# Patient Record
Sex: MALE | Race: WHITE | NOT HISPANIC OR LATINO | URBAN - METROPOLITAN AREA
[De-identification: names, ages, dates, MRNs, and addresses within clinical notes are randomized per-mention and may not be internally consistent; named-entity substitution may affect disease eponyms.]

---

## 2017-01-11 ENCOUNTER — COMMUNICATION - HEALTHEAST (OUTPATIENT)
Dept: FAMILY MEDICINE | Facility: CLINIC | Age: 37
End: 2017-01-11

## 2017-01-23 ENCOUNTER — OFFICE VISIT - HEALTHEAST (OUTPATIENT)
Dept: FAMILY MEDICINE | Facility: CLINIC | Age: 37
End: 2017-01-23

## 2017-01-23 DIAGNOSIS — L30.9 ECZEMA: ICD-10-CM

## 2017-06-19 ENCOUNTER — COMMUNICATION - HEALTHEAST (OUTPATIENT)
Dept: FAMILY MEDICINE | Facility: CLINIC | Age: 37
End: 2017-06-19

## 2017-06-19 DIAGNOSIS — E55.9 VITAMIN D DEFICIENCY: ICD-10-CM

## 2017-06-19 DIAGNOSIS — J45.909 MILD ASTHMA: ICD-10-CM

## 2017-08-07 ENCOUNTER — COMMUNICATION - HEALTHEAST (OUTPATIENT)
Dept: FAMILY MEDICINE | Facility: CLINIC | Age: 37
End: 2017-08-07

## 2017-08-23 ENCOUNTER — COMMUNICATION - HEALTHEAST (OUTPATIENT)
Dept: FAMILY MEDICINE | Facility: CLINIC | Age: 37
End: 2017-08-23

## 2017-08-23 ENCOUNTER — OFFICE VISIT - HEALTHEAST (OUTPATIENT)
Dept: FAMILY MEDICINE | Facility: CLINIC | Age: 37
End: 2017-08-23

## 2017-08-23 DIAGNOSIS — T14.8XXA ABRASION OF SKIN: ICD-10-CM

## 2017-08-23 ASSESSMENT — MIFFLIN-ST. JEOR: SCORE: 2076.31

## 2017-08-29 ENCOUNTER — COMMUNICATION - HEALTHEAST (OUTPATIENT)
Dept: FAMILY MEDICINE | Facility: CLINIC | Age: 37
End: 2017-08-29

## 2017-08-29 ENCOUNTER — COMMUNICATION - HEALTHEAST (OUTPATIENT)
Dept: SCHEDULING | Facility: CLINIC | Age: 37
End: 2017-08-29

## 2017-08-29 DIAGNOSIS — J45.909 ASTHMA, MILD: ICD-10-CM

## 2017-08-30 ENCOUNTER — COMMUNICATION - HEALTHEAST (OUTPATIENT)
Dept: FAMILY MEDICINE | Facility: CLINIC | Age: 37
End: 2017-08-30

## 2017-08-30 ENCOUNTER — AMBULATORY - HEALTHEAST (OUTPATIENT)
Dept: FAMILY MEDICINE | Facility: CLINIC | Age: 37
End: 2017-08-30

## 2017-08-30 DIAGNOSIS — J45.909 ASTHMA, MILD: ICD-10-CM

## 2017-09-15 ENCOUNTER — OFFICE VISIT - HEALTHEAST (OUTPATIENT)
Dept: FAMILY MEDICINE | Facility: CLINIC | Age: 37
End: 2017-09-15

## 2017-09-15 DIAGNOSIS — F41.1 ANXIETY STATE: ICD-10-CM

## 2017-09-15 DIAGNOSIS — J45.901 ASTHMA EXACERBATION: ICD-10-CM

## 2017-09-15 ASSESSMENT — MIFFLIN-ST. JEOR: SCORE: 2100.12

## 2017-11-19 ENCOUNTER — HEALTH MAINTENANCE LETTER (OUTPATIENT)
Age: 37
End: 2017-11-19

## 2017-12-04 ENCOUNTER — COMMUNICATION - HEALTHEAST (OUTPATIENT)
Dept: FAMILY MEDICINE | Facility: CLINIC | Age: 37
End: 2017-12-04

## 2017-12-04 DIAGNOSIS — J45.30 MILD PERSISTENT ASTHMA WITHOUT COMPLICATION: ICD-10-CM

## 2018-02-23 ENCOUNTER — COMMUNICATION - HEALTHEAST (OUTPATIENT)
Dept: FAMILY MEDICINE | Facility: CLINIC | Age: 38
End: 2018-02-23

## 2018-03-22 ENCOUNTER — OFFICE VISIT - HEALTHEAST (OUTPATIENT)
Dept: FAMILY MEDICINE | Facility: CLINIC | Age: 38
End: 2018-03-22

## 2018-03-22 DIAGNOSIS — H60.92 LEFT OTITIS EXTERNA: ICD-10-CM

## 2018-03-22 DIAGNOSIS — H61.22 HEARING LOSS DUE TO CERUMEN IMPACTION, LEFT: ICD-10-CM

## 2018-06-11 ENCOUNTER — OFFICE VISIT - HEALTHEAST (OUTPATIENT)
Dept: FAMILY MEDICINE | Facility: CLINIC | Age: 38
End: 2018-06-11

## 2018-06-11 DIAGNOSIS — L98.9 SKIN LESION OF LEFT ARM: ICD-10-CM

## 2018-09-18 ENCOUNTER — COMMUNICATION - HEALTHEAST (OUTPATIENT)
Dept: FAMILY MEDICINE | Facility: CLINIC | Age: 38
End: 2018-09-18

## 2018-09-19 ENCOUNTER — COMMUNICATION - HEALTHEAST (OUTPATIENT)
Dept: FAMILY MEDICINE | Facility: CLINIC | Age: 38
End: 2018-09-19

## 2018-09-20 ENCOUNTER — COMMUNICATION - HEALTHEAST (OUTPATIENT)
Dept: SCHEDULING | Facility: CLINIC | Age: 38
End: 2018-09-20

## 2018-09-20 ENCOUNTER — OFFICE VISIT - HEALTHEAST (OUTPATIENT)
Dept: FAMILY MEDICINE | Facility: CLINIC | Age: 38
End: 2018-09-20
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
## 2018-09-20 DIAGNOSIS — J45.30 MILD PERSISTENT ASTHMA WITHOUT COMPLICATION: ICD-10-CM

## 2018-09-20 ASSESSMENT — MIFFLIN-ST. JEOR: SCORE: 2049.57

## 2018-09-24 ENCOUNTER — OFFICE VISIT - HEALTHEAST (OUTPATIENT)
Dept: FAMILY MEDICINE | Facility: CLINIC | Age: 38
End: 2018-09-24

## 2018-09-24 DIAGNOSIS — F41.1 ANXIETY STATE: ICD-10-CM

## 2018-09-24 DIAGNOSIS — E55.9 VITAMIN D DEFICIENCY: ICD-10-CM
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
## 2018-09-24 ASSESSMENT — MIFFLIN-ST. JEOR: SCORE: 2045.04

## 2018-09-25 ENCOUNTER — COMMUNICATION - HEALTHEAST (OUTPATIENT)
Dept: FAMILY MEDICINE | Facility: CLINIC | Age: 38
End: 2018-09-25

## 2018-09-25 LAB — 25(OH)D3 SERPL-MCNC: 33.7 NG/ML (ref 30–80)

## 2018-10-26 ENCOUNTER — COMMUNICATION - HEALTHEAST (OUTPATIENT)
Dept: FAMILY MEDICINE | Facility: CLINIC | Age: 38
End: 2018-10-26

## 2019-01-24 ENCOUNTER — COMMUNICATION - HEALTHEAST (OUTPATIENT)
Dept: FAMILY MEDICINE | Facility: CLINIC | Age: 39
End: 2019-01-24

## 2019-05-09 ENCOUNTER — OFFICE VISIT - HEALTHEAST (OUTPATIENT)
Dept: FAMILY MEDICINE | Facility: CLINIC | Age: 39
End: 2019-05-09

## 2019-05-09 ENCOUNTER — HOSPITAL ENCOUNTER (OUTPATIENT)
Dept: LAB | Age: 39
Setting detail: SPECIMEN
Discharge: HOME | End: 2019-05-09

## 2019-05-09 DIAGNOSIS — Z13.228 ENCOUNTER FOR SCREENING FOR OTHER METABOLIC DISORDERS: ICD-10-CM

## 2019-05-09 DIAGNOSIS — Z00.00 ROUTINE GENERAL MEDICAL EXAMINATION AT A HEALTH CARE FACILITY: ICD-10-CM

## 2019-05-09 DIAGNOSIS — E55.9 VITAMIN D DEFICIENCY: ICD-10-CM

## 2019-05-09 DIAGNOSIS — F41.9 ANXIETY: ICD-10-CM

## 2019-05-09 LAB
CHOLEST SERPL-MCNC: 196 MG/DL
FASTING STATUS PATIENT QL REPORTED: NO
HBA1C MFR BLD: 5.9 % (ref 3.5–6)
HDLC SERPL-MCNC: 61 MG/DL
LDLC SERPL CALC-MCNC: 130 MG/DL

## 2019-05-09 ASSESSMENT — MIFFLIN-ST. JEOR: SCORE: 2033.25

## 2019-05-10 ENCOUNTER — COMMUNICATION - HEALTHEAST (OUTPATIENT)
Dept: FAMILY MEDICINE | Facility: CLINIC | Age: 39
End: 2019-05-10

## 2019-05-10 LAB
25(OH)D3 SERPL-MCNC: 31.3 NG/ML (ref 30–80)
25(OH)D3 SERPL-MCNC: 31.3 NG/ML (ref 30–80)

## 2019-05-12 ENCOUNTER — COMMUNICATION - HEALTHEAST (OUTPATIENT)
Dept: FAMILY MEDICINE | Facility: CLINIC | Age: 39
End: 2019-05-12

## 2019-05-12 DIAGNOSIS — J45.30 MILD PERSISTENT ASTHMA WITHOUT COMPLICATION: ICD-10-CM

## 2019-06-24 ENCOUNTER — COMMUNICATION - HEALTHEAST (OUTPATIENT)
Dept: FAMILY MEDICINE | Facility: CLINIC | Age: 39
End: 2019-06-24

## 2019-06-24 DIAGNOSIS — J45.30 MILD PERSISTENT ASTHMA WITHOUT COMPLICATION: ICD-10-CM

## 2019-08-02 ENCOUNTER — COMMUNICATION - HEALTHEAST (OUTPATIENT)
Dept: FAMILY MEDICINE | Facility: CLINIC | Age: 39
End: 2019-08-02

## 2019-09-18 ENCOUNTER — COMMUNICATION - HEALTHEAST (OUTPATIENT)
Dept: FAMILY MEDICINE | Facility: CLINIC | Age: 39
End: 2019-09-18

## 2019-10-08 ENCOUNTER — COMMUNICATION - HEALTHEAST (OUTPATIENT)
Dept: SCHEDULING | Facility: CLINIC | Age: 39
End: 2019-10-08

## 2019-10-08 ENCOUNTER — OFFICE VISIT - HEALTHEAST (OUTPATIENT)
Dept: FAMILY MEDICINE | Facility: CLINIC | Age: 39
End: 2019-10-08

## 2019-10-08 DIAGNOSIS — J45.31 MILD PERSISTENT ASTHMA WITH ACUTE EXACERBATION: ICD-10-CM

## 2019-10-08 ASSESSMENT — MIFFLIN-ST. JEOR: SCORE: 1992.42

## 2019-10-09 ENCOUNTER — COMMUNICATION - HEALTHEAST (OUTPATIENT)
Dept: FAMILY MEDICINE | Facility: CLINIC | Age: 39
End: 2019-10-09

## 2019-10-09 DIAGNOSIS — J45.31 MILD PERSISTENT ASTHMA WITH ACUTE EXACERBATION: ICD-10-CM

## 2020-01-03 ENCOUNTER — COMMUNICATION - HEALTHEAST (OUTPATIENT)
Dept: FAMILY MEDICINE | Facility: CLINIC | Age: 40
End: 2020-01-03

## 2020-01-13 ENCOUNTER — OFFICE VISIT - HEALTHEAST (OUTPATIENT)
Dept: FAMILY MEDICINE | Facility: CLINIC | Age: 40
End: 2020-01-13

## 2020-01-13 ENCOUNTER — COMMUNICATION - HEALTHEAST (OUTPATIENT)
Dept: FAMILY MEDICINE | Facility: CLINIC | Age: 40
End: 2020-01-13

## 2020-01-13 DIAGNOSIS — J45.901 MODERATE ASTHMA WITH EXACERBATION, UNSPECIFIED WHETHER PERSISTENT: ICD-10-CM

## 2020-01-13 DIAGNOSIS — J45.31 MILD PERSISTENT ASTHMA WITH ACUTE EXACERBATION: ICD-10-CM

## 2020-01-13 DIAGNOSIS — E55.9 VITAMIN D DEFICIENCY: ICD-10-CM

## 2020-01-13 ASSESSMENT — MIFFLIN-ST. JEOR: SCORE: 1995.14

## 2020-01-13 ASSESSMENT — PATIENT HEALTH QUESTIONNAIRE - PHQ9: SUM OF ALL RESPONSES TO PHQ QUESTIONS 1-9: 16

## 2020-01-14 ENCOUNTER — COMMUNICATION - HEALTHEAST (OUTPATIENT)
Dept: FAMILY MEDICINE | Facility: CLINIC | Age: 40
End: 2020-01-14

## 2020-01-14 LAB — 25(OH)D3 SERPL-MCNC: 24.2 NG/ML (ref 30–80)

## 2020-01-20 ENCOUNTER — RECORDS - HEALTHEAST (OUTPATIENT)
Dept: ADMINISTRATIVE | Facility: OTHER | Age: 40
End: 2020-01-20

## 2020-01-21 ENCOUNTER — COMMUNICATION - HEALTHEAST (OUTPATIENT)
Dept: FAMILY MEDICINE | Facility: CLINIC | Age: 40
End: 2020-01-21

## 2020-02-12 ENCOUNTER — COMMUNICATION - HEALTHEAST (OUTPATIENT)
Dept: FAMILY MEDICINE | Facility: CLINIC | Age: 40
End: 2020-02-12

## 2020-04-08 ENCOUNTER — COMMUNICATION - HEALTHEAST (OUTPATIENT)
Dept: FAMILY MEDICINE | Facility: CLINIC | Age: 40
End: 2020-04-08

## 2020-04-08 DIAGNOSIS — J45.901 MODERATE ASTHMA WITH EXACERBATION, UNSPECIFIED WHETHER PERSISTENT: ICD-10-CM

## 2020-06-30 ENCOUNTER — COMMUNICATION - HEALTHEAST (OUTPATIENT)
Dept: FAMILY MEDICINE | Facility: CLINIC | Age: 40
End: 2020-06-30

## 2020-06-30 DIAGNOSIS — J45.901 MODERATE ASTHMA WITH EXACERBATION, UNSPECIFIED WHETHER PERSISTENT: ICD-10-CM

## 2020-11-18 ENCOUNTER — COMMUNICATION - HEALTHEAST (OUTPATIENT)
Dept: FAMILY MEDICINE | Facility: CLINIC | Age: 40
End: 2020-11-18

## 2020-11-18 DIAGNOSIS — J45.31 MILD PERSISTENT ASTHMA WITH ACUTE EXACERBATION: ICD-10-CM

## 2020-11-22 ENCOUNTER — COMMUNICATION - HEALTHEAST (OUTPATIENT)
Dept: FAMILY MEDICINE | Facility: CLINIC | Age: 40
End: 2020-11-22

## 2020-12-02 ENCOUNTER — COMMUNICATION - HEALTHEAST (OUTPATIENT)
Dept: FAMILY MEDICINE | Facility: CLINIC | Age: 40
End: 2020-12-02

## 2020-12-02 ENCOUNTER — OFFICE VISIT - HEALTHEAST (OUTPATIENT)
Dept: FAMILY MEDICINE | Facility: CLINIC | Age: 40
End: 2020-12-02

## 2020-12-02 DIAGNOSIS — F41.1 ANXIETY STATE: ICD-10-CM

## 2020-12-02 DIAGNOSIS — J45.30 MILD PERSISTENT ASTHMA WITHOUT COMPLICATION: ICD-10-CM

## 2020-12-02 DIAGNOSIS — E55.9 VITAMIN D DEFICIENCY: ICD-10-CM

## 2020-12-02 ASSESSMENT — PATIENT HEALTH QUESTIONNAIRE - PHQ9: SUM OF ALL RESPONSES TO PHQ QUESTIONS 1-9: 16

## 2021-02-10 ENCOUNTER — COMMUNICATION - HEALTHEAST (OUTPATIENT)
Dept: FAMILY MEDICINE | Facility: CLINIC | Age: 41
End: 2021-02-10

## 2021-02-10 DIAGNOSIS — J45.31 MILD PERSISTENT ASTHMA WITH ACUTE EXACERBATION: ICD-10-CM

## 2021-02-24 ENCOUNTER — COMMUNICATION - HEALTHEAST (OUTPATIENT)
Dept: FAMILY MEDICINE | Facility: CLINIC | Age: 41
End: 2021-02-24

## 2021-05-26 ENCOUNTER — COMMUNICATION - HEALTHEAST (OUTPATIENT)
Dept: FAMILY MEDICINE | Facility: CLINIC | Age: 41
End: 2021-05-26

## 2021-05-26 DIAGNOSIS — J45.31 MILD PERSISTENT ASTHMA WITH ACUTE EXACERBATION: ICD-10-CM

## 2021-05-27 ASSESSMENT — PATIENT HEALTH QUESTIONNAIRE - PHQ9
SUM OF ALL RESPONSES TO PHQ QUESTIONS 1-9: 16
SUM OF ALL RESPONSES TO PHQ QUESTIONS 1-9: 16

## 2021-05-28 ASSESSMENT — ASTHMA QUESTIONNAIRES
ACT_TOTALSCORE: 8
ACT_TOTALSCORE: 10
ACT_TOTALSCORE: 12

## 2021-05-28 NOTE — PROGRESS NOTES
Assessment and Plan:       Ilir was seen today for annual wellness visit.    Routine general medical examination at a health care facility  Declines vaccines.    Anxiety  -     hydrOXYzine HCl (ATARAX) 25 MG tablet; Take 1 tablet (25 mg total) by mouth 3 (three) times a day as needed (nausea associated with anxiety).  Recommend trial of L- theanine. Continue counseling.    Vitamin D deficiency  -     Vitamin D, Total (25-Hydroxy)    Encounter for screening for other metabolic disorders  -     Glycosylated Hemoglobin A1c  -     HDL Cholesterol  -     LDL Cholesterol, Direct  -     Cholesterol, Total        The patient's current medical problems were reviewed.    I have had an Advance Directives discussion with the patient.  The following health maintenance schedule was reviewed with the patient and provided in printed form in the after visit summary:   Health Maintenance   Topic Date Due     DEPRESSION FOLLOW UP  1981     TDAP ADULT ONE TIME DOSE  09/06/1999     ADVANCE DIRECTIVES DISCUSSED WITH PATIENT  03/31/2016     INFLUENZA VACCINE RULE BASED (Season Ended) 08/01/2019     ASTHMA CONTROL TEST  09/20/2019     ASTHMA FOLLOW-UP  09/20/2019     TD 18+ HE  Discontinued        Subjective:   Chief Complaint: Ilir Hidalgo is an 38 y.o. male here for an Annual Wellness visit.   HPI:  1) Asthma - not well controlled.  2) Vitamin D deficiency - He continues on vitamin D supplementation.  3) Anxiety - Would like refill of hydroxyzine. Continues to be under lot of stress.  He does see Balbina LY on regular basis.    SH: Lives with mother.  Works in IT administrative support.  Using air fryer and Instapot to make healthier meals.  Not exercising.  1 drink a week.  No drugs.  Not sexually active.    Review of Systems:    Please see above.  The rest of the review of systems are negative for all systems.    Patient Care Team:  Lisa Galeano MD as PCP - General     Patient Active Problem  List   Diagnosis     Eczema     Nasal Polyps     Mild asthma     Anxiety Disorder NOS     Vitamin D Deficiency     Allergic Rhinitis     Insomnia     Learning Disability - Language     Obesity     Autistic Disorder     Tooth Eruption Disturbances     Caries     Dermatitis     Fatigue     Past Medical History:   Diagnosis Date     Allergic Rhinitis     Created by Conversion      Anxiety Disorder NOS     Created by Conversion      Asthma With Acute Exacerbation     Created by Conversion      Autistic Disorder     Created by Conversion      Caries     Created by Conversion      Eczema     Created by Conversion      Insomnia     Created by Conversion      Learning Disability - Language     Created by Conversion      Mild Persistent Asthma     Created by Conversion      Nasal Polyps     Created by Conversion      Obesity     Created by Conversion      Tooth Eruption Disturbances     Created by Conversion      Vitamin D Deficiency     Created by Conversion       No past surgical history on file.   No family history on file.   Social History     Socioeconomic History     Marital status: Single     Spouse name: Not on file     Number of children: Not on file     Years of education: Not on file     Highest education level: Not on file   Occupational History     Not on file   Social Needs     Financial resource strain: Not on file     Food insecurity:     Worry: Not on file     Inability: Not on file     Transportation needs:     Medical: Not on file     Non-medical: Not on file   Tobacco Use     Smoking status: Never Smoker     Smokeless tobacco: Never Used   Substance and Sexual Activity     Alcohol use: Yes     Drug use: No     Sexual activity: Not on file   Lifestyle     Physical activity:     Days per week: Not on file     Minutes per session: Not on file     Stress: Not on file   Relationships     Social connections:     Talks on phone: Not on file     Gets together: Not on file     Attends [employer] service: Not on  "file     Active member of club or organization: Not on file     Attends meetings of clubs or organizations: Not on file     Relationship status: Not on file     Intimate partner violence:     Fear of current or ex partner: Not on file     Emotionally abused: Not on file     Physically abused: Not on file     Forced sexual activity: Not on file   Other Topics Concern     Not on file   Social History Narrative     Not on file      Current Outpatient Medications   Medication Sig Dispense Refill     albuterol (VENTOLIN HFA) 90 mcg/actuation inhaler Inhale 2 puffs every 4 hours as needed for wheezing. 18 g 5     ergocalciferol (ERGOCALCIFEROL) 50,000 unit capsule Take 1 capsules by mouth every 2 weeks 6 capsule 0     fluticasone (FLOVENT HFA) 110 mcg/actuation inhaler Inhale 2 puffs 2 (two) times a day. 1 Inhaler 5     hydrOXYzine (ATARAX) 25 MG tablet Take 1 tablet (25 mg total) by mouth 3 (three) times a day as needed (nausea associated with anxiety). 30 tablet 5     inhalational spacing device Spcr Use As Directed. With Inhaler       loratadine (CLARITIN) 10 mg tablet Take 1 tablet (10 mg total) by mouth daily.       montelukast (SINGULAIR) 10 mg tablet Take 1 tablet (10 mg total) by mouth daily. 30 tablet 7     No current facility-administered medications for this visit.       Objective:   Vital Signs:   Visit Vitals  /86 (Patient Site: Right Arm, Patient Position: Sitting, Cuff Size: Adult Large)   Pulse 83   Ht 5' 10.5\" (1.791 m)   Wt (!) 243 lb 6.4 oz (110.4 kg)   BMI 34.43 kg/m         VisionScreening:  No exam data present     PHYSICAL EXAM  /86 (Patient Site: Right Arm, Patient Position: Sitting, Cuff Size: Adult Large)   Pulse 83   Ht 5' 10.5\" (1.791 m)   Wt (!) 243 lb 6.4 oz (110.4 kg)   BMI 34.43 kg/m    No acute distress  HEENT: Head atraumatic / normocephalic.  PERRL.  Conjunctiva clear. Nose with no discharge.  Tympanic membranes grey with normal landmarks.  OP - pink and moist.  Poor " hygiene / dentition.  Neck: Supple.  No lymphadenopathy or thyromegaly.  Lungs: CTA.  No retractions or tachypnea.  CV: RRR. S1 and S2 normal.  No murmurs / rubs / gallops.  No lower extremity edema.    Abdomen: Soft. Non tender. Non distended.  No HSM or masses.  Skin: No rashes or lesions.  Neuro: AAOx3.  Normal strength and tone.  Normal gait.  DTRs in lower extremities equal bilaterally.  Psych: Mood and affect normal.  Good eye contact.  Normal speech.       Wt Readings from Last 3 Encounters:   05/09/19 (!) 243 lb 6.4 oz (110.4 kg)   09/24/18 (!) 246 lb (111.6 kg)   09/20/18 (!) 247 lb (112 kg)         Assessment Results 5/9/2019   Activities of Daily Living No help needed   Instrumental Activities of Daily Living No help needed   Mini Cog Total Score 5   Some recent data might be hidden     A Mini-Cog score of 0-2 suggests the possibility of dementia, score of 3-5 suggests no dementia    Identified Health Risks:     The patient was provided with suggestions to help him develop a healthy emotional lifestyle.   The patient s PHQ-9 score is consistent with moderate depression. He is under care of therapist.  Information regarding advance directives (living [place]), including where he can download the appropriate form, was provided to the patient via the AVS.     Lisa Galeano

## 2021-05-28 NOTE — TELEPHONE ENCOUNTER
RN cannot approve Refill Request    RN can NOT refill this medication med is not covered by policy/route to provider     . Last office visit: 9/24/2018 Lisa Galeano MD Last Physical: 5/9/2019 Last MTM visit: Visit date not found Last visit same specialty: 9/24/2018 Lisa Galeano MD.  Next visit within 3 mo: Visit date not found  Next physical within 3 mo: Visit date not found      Jacklyn Ayala, [facility] Triage/Med Refill 5/13/2019    Requested Prescriptions   Pending Prescriptions Disp Refills     FLOVENT  mcg/actuation inhaler [Pharmacy Med Name: FLOVENT  MCG ORAL INH 120INH]  0     Sig: INHALE 2 PUFFS BY MOUTH TWICE DAILY       There is no refill protocol information for this order

## 2021-05-30 VITALS — WEIGHT: 247 LBS | BODY MASS INDEX: 34.97 KG/M2

## 2021-05-31 VITALS — BODY MASS INDEX: 36.97 KG/M2 | WEIGHT: 258.25 LBS | HEIGHT: 70 IN

## 2021-05-31 VITALS — BODY MASS INDEX: 36.22 KG/M2 | WEIGHT: 253 LBS | HEIGHT: 70 IN

## 2021-06-01 VITALS — BODY MASS INDEX: 35.76 KG/M2 | WEIGHT: 252.6 LBS

## 2021-06-02 VITALS — BODY MASS INDEX: 34.58 KG/M2 | HEIGHT: 71 IN | WEIGHT: 247 LBS

## 2021-06-02 VITALS — HEIGHT: 71 IN | BODY MASS INDEX: 34.44 KG/M2 | WEIGHT: 246 LBS

## 2021-06-02 NOTE — PROGRESS NOTES
Assessment / Impression     1. Mild persistent asthma with acute exacerbation  Nebulizer with supplies (cup, tubing, mask, & filters)    albuterol (ACCUNEB) 1.25 mg/3 mL nebulizer solution    azithromycin (ZITHROMAX Z-LEENA) 250 MG tablet    predniSONE (DELTASONE) 20 MG tablet    fluticasone propionate (FLOVENT HFA) 110 mcg/actuation inhaler    albuterol (VENTOLIN HFA) 90 mcg/actuation inhaler         Plan:     Discussed with patient and mother findings on exam, and patient likely has asthma exacerbation.  Given length of time of symptoms, would recommend treatment with antibiotics to cover atypical bacteria.  Discussed use of Z-Leena.  I also educated patient regarding use of Flovent inhaler, that it needs to be used daily in order to be effective as a maintenance steroid.  Counseled patient regarding use of inhaler.  Also discussed with patient that albuterol inhaler should only be used as a rescue inhaler.   discuss with patient and mother that at this time I would recommend we treat this acute asthma exacerbation with 5-day course of prednisone and to reevaluate his symptoms at this time.  Also discussed that we could hold off on albuterol nebulizer machine with vials, though mother was very adamant that patient should have nebulizer machine because she is worried that he does not know how to use his albuterol inhaler.  Therefore, patient was given a nebulizer machine, DME paperwork was signed, copy will be scanned.  Advised patient to not use albuterol nebulizer machine with his inhaler at the same time.  Also advised patient to follow-up with PCP in 1 week to reassess symptoms, and if they do want to do further evaluation of asthma at that time such as spirometry can do so, but would not recommend doing this evaluation during acute exacerbation.  Mother and patient understand, agree with plan.    I spent total time 40 minutes, counseling time greater than 50% counseling patient and mother regarding treatment  "options and plan, counseling regarding medication use.      Return in about 1 week (around 10/15/2019) for Follow Up for asthma.    Subjective:      HPI: Ilir Hidalgo is a 39 y.o. male, new to me, here today with mother who presents for asthma concerns.  Patient has a history of mild persistent asthma and has Flovent and albuterol inhalers prescribed, though patient states he has not been using his Flovent inhaler consistently because he says he does not know how to, though over the last week with this recent acute illness has been using his albuterol inhaler at least 5-6 times per day.  Patient noted cold symptoms including runny nose and cough that started approximately 1 week ago.  Since then, symptoms have gotten worse, noting wheezing particularly at night and some ear pain.      His mother is very concerned that patient does not know how to use his inhalers, and is also requesting albuterol nebulizer machine to be used in addition to having inhaler.  She also states that he needs \"his asthma worked up\".      Medical History:     Patient Active Problem List   Diagnosis     Eczema     Nasal Polyps     Mild asthma     Anxiety Disorder NOS     Vitamin D Deficiency     Allergic Rhinitis     Insomnia     Learning Disability - Language     Obesity     Autistic Disorder     Tooth Eruption Disturbances     Caries     Dermatitis     Fatigue       Past Medical History:   Diagnosis Date     Allergic Rhinitis     Created by Conversion      Anxiety Disorder NOS     Created by Conversion      Asthma With Acute Exacerbation     Created by Conversion      Autistic Disorder     Created by Conversion      Caries     Created by Conversion      Eczema     Created by Conversion      Insomnia     Created by Conversion      Learning Disability - Language     Created by Conversion      Mild Persistent Asthma     Created by Conversion      Nasal Polyps     Created by Conversion      Obesity     Created by Conversion      Tooth " Eruption Disturbances     Created by Conversion      Vitamin D Deficiency     Created by Conversion        No past surgical history on file.    Current Medications:     Current Outpatient Medications   Medication Sig     albuterol (VENTOLIN HFA) 90 mcg/actuation inhaler Inhale 2 puffs every 4 hours as needed for wheezing.     fluticasone propionate (FLOVENT HFA) 110 mcg/actuation inhaler Inhale 2 puffs 2 (two) times a day.     guaiFENesin ER (MUCINEX) 600 mg 12 hr tablet Take 1,200 mg by mouth 2 (two) times a day.     hydrOXYzine HCl (ATARAX) 25 MG tablet Take 1 tablet (25 mg total) by mouth 3 (three) times a day as needed (nausea associated with anxiety).     inhalational spacing device Spcr Use As Directed. With Inhaler     loratadine (CLARITIN) 10 mg tablet Take 1 tablet (10 mg total) by mouth daily.     multivitamin-Ca-iron-minerals Tab Take 1 tablet by mouth.     albuterol (ACCUNEB) 1.25 mg/3 mL nebulizer solution Take 3 mL (1.25 mg total) by nebulization 3 (three) times a day.     azithromycin (ZITHROMAX Z-LEENA) 250 MG tablet Take 2 tablets (500 mg) on  Day 1,  followed by 1 tablet (250 mg) once daily on Days 2 through 5.     ergocalciferol (ERGOCALCIFEROL) 50,000 unit capsule Take 1 capsules by mouth every 2 weeks     montelukast (SINGULAIR) 10 mg tablet Take 10 mg by mouth.     predniSONE (DELTASONE) 20 MG tablet Take 20 mg by mouth 2 (two) times a day for 5 days.       Family History:   No family history on file.    Review of Systems  All other systems reviewed and are negative.         Social History:     Social History     Tobacco Use   Smoking Status Never Smoker   Smokeless Tobacco Never Used     Social History     Patient does not qualify to have social determinant information on file (likely too young).   Social History Narrative     Not on file         Objective:     /74 (Patient Site: Right Arm, Patient Position: Sitting, Cuff Size: Adult Large)   Pulse 100   Temp 98.1  F (36.7  C) (Oral)    " 5' 10.5\" (1.791 m)   Wt (!) 234 lb 6.4 oz (106.3 kg)   SpO2 95%   BMI 33.16 kg/m    Physical Examination: General appearance - alert, well appearing, and in no distress  Eyes: pupils equal and reactive, extraocular eye movements intact  Ears: normal external ears, clear canals,  Left TM appears normal, with no redness or bulging noted.  Right TM appears normal, with no redness or bulging noted.  Mouth: mucous membranes moist, pharynx normal without lesions  Neck: supple, no significant adenopathy or thyromegaly  Lungs: Normal respiratory effort, though diffuse expiratory wheezing throughout both lung fields.    Heart: normal rate, regular rhythm, normal S1, S2, no murmurs.    No results found for this or any previous visit (from the past 168 hour(s)).      Lina Nichols MD  10/8/2019  5:29 PM        "

## 2021-06-02 NOTE — TELEPHONE ENCOUNTER
"Mother reports \"cough for about ten days.\"  Exacerbates his asthma.  \"Just a normal cough-cold.\"  No fever.  However states \"This causes his asthma to flare up.\"  \"Trouble breathing in his sleep.\"  Upon reviewing meds, mother does not realize pt has inhalers prescribed.  Pt does not appear to be using albuterol inhaler or Flovent.  Explained these are prescribed for control of pt's asthma.  Mother states \"needing a class on asthma inhalers for herself and con.\"    Mother reports needin)  New nebulizer machine  2)  Neb vials  3)  Instructions on how to use inhalers    Agrees to clinical eval today.  Warm transferred to a  for this purpose now.    Annie Mccauley RN BSBA  [facility] RN Triage     Reason for Disposition    Wheezing is present     Cold-induced asthma flare.    Protocols used: COUGH-A-OH      "

## 2021-06-03 VITALS
HEIGHT: 71 IN | BODY MASS INDEX: 32.81 KG/M2 | DIASTOLIC BLOOD PRESSURE: 74 MMHG | SYSTOLIC BLOOD PRESSURE: 112 MMHG | OXYGEN SATURATION: 95 % | TEMPERATURE: 98.1 F | WEIGHT: 234.4 LBS | HEART RATE: 100 BPM

## 2021-06-03 VITALS — WEIGHT: 243.4 LBS | HEIGHT: 71 IN | BODY MASS INDEX: 34.07 KG/M2

## 2021-06-04 VITALS
WEIGHT: 235 LBS | HEART RATE: 99 BPM | OXYGEN SATURATION: 95 % | DIASTOLIC BLOOD PRESSURE: 80 MMHG | SYSTOLIC BLOOD PRESSURE: 102 MMHG | HEIGHT: 71 IN | BODY MASS INDEX: 32.9 KG/M2

## 2021-06-05 NOTE — TELEPHONE ENCOUNTER
PA is not needed. Pharmacy was trying to bill patient's medicare part D.  Neb medications need to go thru Medicare Part B.  Informed  Pharmacy to get patient's Part B information and medication will be covered.

## 2021-06-05 NOTE — PROGRESS NOTES
"SUBJECTIVE: Ilir Hidalgo is a 39 y.o. male with:  Chief Complaint   Patient presents with     Cough     Coughing over 2 months mostly at night     Eczema     on  both hand     Medication Refill     Nebulizer meds   He is here with his mother.  Having more issues with asthma over last few months:     He had bronchitis starting in late September.  He developed URI in November.  He then developed a cough worse at night.  Had been producing some green sputum.  He was seen at urgent care center and diagnosed with bronchitis/ asthma exacerbation.  He took prednisone and antibiotics. He was given nebulizer for albuterol nebs and seemed to help him sleep.  Has been using his albuterol inhaler three times a day.  Using Flovent 110 mcg off / on.   Has not been taking Singulair regularly.    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  He spends 2 days a week in the office.    He is taking vitamin D3 10,000 IU daily.   Anxiety is about the same.  He continues to see Balbina Wahl for counseling.    OBJECTIVE: /80 (Patient Site: Left Arm, Patient Position: Sitting, Cuff Size: Adult Regular)   Pulse 99   Ht 5' 10.5\" (1.791 m)   Wt (!) 235 lb (106.6 kg)   SpO2 95%   BMI 33.24 kg/m     No acute distress.  HEENT: Head is atraumatic and/normocephalic.  PERRL.  Conjunctiva clear.  Tympanic membranes grey with normal landmarks and normal light reflexes.  No nasal discharge.  Oropharynx is pink and moist.  Sinuses nontender.  Neck: Supple.  No lymphadenopathy or thyromegaly.  Lungs: He has some slight end expiratory wheezing but no retractions, or tachypnea.  Heart: RRR. S1 and S2 normal.  No murmurs, rubs, or gallops.  Neuro: Awake, alert, oriented x 3.  Normal strength and tone.  Normal gait.     Ilir was seen today for cough, eczema and medication refill.    Diagnoses and all orders for this visit:    Moderate asthma with exacerbation, unspecified " whether persistent  -     fluticasone propionate (FLOVENT HFA) 220 mcg/actuation inhaler; Inhale 2 puffs 2 (two) times a day.  -     ipratropium-albuterol (DUO-NEB) 0.5-2.5 mg/3 mL nebulizer; Take 3 mL by nebulization every 6 (six) hours as needed.    Vitamin D deficiency  -     Vitamin D, Total (25-Hydroxy)       Patient Instructions   Use Flovent ( fluticasone ) inhaler 2 puffs twice daily ( decreases inflammation)    DuoNeb 1 vial in neb machine every 6 hours as needed     Try Nordic Natural gummies for fish oil. Keep in refrigerator.    Follow-up 1/22/20 at 10 pm for recheck     Lisa Galeano

## 2021-06-05 NOTE — PATIENT INSTRUCTIONS - HE
Use Flovent ( fluticasone ) inhaler 2 puffs twice daily ( decreases inflammation)    DuoNeb 1 vial in neb machine every 6 hours as needed     Try Nordic Natural gummies for fish oil. Keep in refrigerator.    Follow-up 1/22/20 at 10 pm for recheck

## 2021-06-05 NOTE — TELEPHONE ENCOUNTER
RN cannot approve Refill Request    RN can NOT refill this medication med is not covered by policy/route to provider. Last office visit: 10/8/2019 Lina Nichols MD Last Physical: Visit date not found Last MTM visit: Visit date not found Last visit same specialty: 10/8/2019 Lina Nichols MD.  Next visit within 3 mo: Visit date not found  Next physical within 3 mo: Visit date not found      Dulce Anderson, [facility] Triage/Med Refill 1/14/2020    Requested Prescriptions   Pending Prescriptions Disp Refills     fluticasone propionate (FLOVENT HFA) 110 mcg/actuation inhaler [Pharmacy Med Name: FLOVENT  MCG ORAL INH 120INH] 1 Inhaler 1     Sig: INHALE 2 PUFFS BY MOUTH TWICE DAILY       There is no refill protocol information for this order

## 2021-06-05 NOTE — TELEPHONE ENCOUNTER
Prior Authorization Request  Who s requesting:  Pharmacy  Pharmacy Name and Location: [facility]  Medication Name: ipratropium-albuterol (DUO-NEB) 0.5-2.5 mg/3 mL nebulizer  Insurance Plan: [employer]   Insurance Member ID Number:  [id number]  CoverMyMeds Key: [id number]/A  Informed patient that prior authorizations can take up to 10 business days for response:   NA  Okay to leave a detailed message: Yes

## 2021-06-07 NOTE — TELEPHONE ENCOUNTER
RN cannot approve Refill Request    RN can NOT refill this medication med is not covered by policy/route to provider. Last office visit: 1/13/2020 Lisa Galeano MD Last Physical: 5/9/2019 Last MTM visit: Visit date not found Last visit same specialty: 1/13/2020 Lisa Galeano MD.  Next visit within 3 mo: Visit date not found  Next physical within 3 mo: Visit date not found      Catherine Crespo, [facility] Triage/Med Refill 4/8/2020    Requested Prescriptions   Pending Prescriptions Disp Refills     FLOVENT  mcg/actuation inhaler [Pharmacy Med Name: FLOVENT  MCG ORAL INH 120INH] 1 Inhaler 0     Sig: INHALE 2 PUFFS BY MOUTH TWICE DAILY       There is no refill protocol information for this order

## 2021-06-08 NOTE — PROGRESS NOTES
SUBJECTIVE: Ilir Hidalgo is a 36 y.o. male with:  Chief Complaint   Patient presents with     Hand concern     irritation of the skin    He has dry skin on his hands. There are some areas that get excoriated due to scratching.  He gets this every winter.  He has been using different kinds of topical ointments.  He started a humidifier in his home.  He is not using any steroid cream.  He is taking vitamin D3 2000 IU and CereVive daily.  He is not taking fish oil.  He has been eating more yogurt.  He has stopped drinking milk.      OBJECTIVE:   Visit Vitals     /74     Pulse 87     Wt (!) 247 lb (112 kg)     SpO2 97%     BMI 34.97 kg/m2    no distress  Hands: Dry skin with some pink scaly patches and excoriated areas..    Ilir was seen today for hand concern.    Diagnoses and all orders for this visit:    Eczema    Other orders  -     triamcinolone (KENALOG) 0.5 % ointment; Apply to hands at bedtime PRN     Use triamcinolone ointment to hands at bedtime.    Try using coconut oil on hands during the day.    Avoid soaps/ lotions with fragrance.    Consider adding fish oil / probiotics to your vitamin D    Lisa Galeano

## 2021-06-09 NOTE — TELEPHONE ENCOUNTER
RN cannot approve Refill Request    RN can NOT refill this medication med is not covered by policy/route to provider     . Last office visit: 1/13/2020 Lisa Galeano MD Last Physical: 5/9/2019 Last MTM visit: Visit date not found Last visit same specialty: 1/13/2020 Lisa Galeano MD.  Next visit within 3 mo: Visit date not found  Next physical within 3 mo: Visit date not found      Jacklyn Ayala, [facility] Triage/Med Refill 7/1/2020    Requested Prescriptions   Pending Prescriptions Disp Refills     FLOVENT  mcg/actuation inhaler [Pharmacy Med Name: FLOVENT  MCG ORAL INH 120INH] 1 Inhaler 11     Sig: INHALE 2 PUFFS BY MOUTH TWICE DAILY       There is no refill protocol information for this order

## 2021-06-12 ENCOUNTER — HEALTH MAINTENANCE LETTER (OUTPATIENT)
Age: 41
End: 2021-06-12

## 2021-06-12 NOTE — PROGRESS NOTES
"OFFICE VISIT - FAMILY MEDICINE     ASSESSMENT AND PLAN     1. Abrasion of skin in the perineal area    Wound care was provided today, bacitracin was applied, instruction to continue the same at home was given, was also instructed to return if he noticed more bleeding.    CHIEF COMPLAINT   Mass (BC-0 - HEMORRHOID)    HPI   Ilir Hidalgo is a 36 y.o. male.  No Patient Care Coordination Note on file.  Patient had some bleeding in the perineal area this morning, he was able to control that with a wet towel, he is here today for further care, denies any blood in the stool.  No abdominal pain, no similar bleeding or other part of his body.    Review of Systems As per HPI, otherwise negative.    OBJECTIVE   /88 (Patient Site: Right Arm)  Pulse 68  Temp 98.9  F (37.2  C) (Oral)   Resp 13  Ht 5' 10.47\" (1.79 m)  Wt (!) 253 lb (114.8 kg)  SpO2 96%  BMI 35.82 kg/m2  Physical Exam  Perineal exam show skin abrasion in the left side about 1 x 1 cm, with no active bleeding and a small broken pimple on the right side with no active bleeding, he does have one  Large cauliflower warty lesion about 2 cm in the right perianal area with no tenderness.  With no blood or sign of broken skin.    External rectal exam showed soft green stool in the anus, but no bright red blood.  [facility]   No family history on file.  Social History     Social History     Marital status: Single     Spouse name: N/A     Number of children: N/A     Years of education: N/A     Occupational History     Not on file.     Social History Main Topics     Smoking status: Never Smoker     Smokeless tobacco: Never Used     Alcohol use Yes     Drug use: No     Sexual activity: Not on file     Other Topics Concern     Not on file     Social History Narrative       [facility]     Patient Active Problem List    Diagnosis Date Noted     Dermatitis 01/22/2015     Fatigue 01/22/2015     Tooth Eruption Disturbances      Overview Note:     Created by Conversion         " Caries      Overview Note:     Created by Conversion    Replacement Utility updated for latest IMO load       Eczema      Overview Note:     Created by Conversion         Nasal Polyps      Overview Note:     Created by Conversion    Replacement Utility updated for latest IMO load       Mild asthma      Overview Note:     Created by Conversion         Anxiety Disorder NOS      Overview Note:     Created by Conversion    Replacement Utility updated for latest IMO load       Vitamin D Deficiency      Overview Note:     Created by Conversion    Replacement Utility updated for latest IMO load       Allergic Rhinitis      Overview Note:     Created by Conversion    Replacement Utility updated for latest IMO load       Insomnia      Overview Note:     Created by Conversion         Learning Disability - Language      Overview Note:     Created by Conversion         Obesity      Overview Note:     Created by Conversion         Autistic Disorder      Overview Note:     Created by Conversion    Replacement Utility updated for latest IMO load       No past surgical history on file.    RESULTS/CONSULTS (Lab/Rad)   No results found for this or any previous visit (from the past 168 hour(s)).  No results found.  MEDICATIONS     Current Outpatient Prescriptions on File Prior to Visit   Medication Sig Dispense Refill     albuterol (VENTOLIN HFA) 90 mcg/actuation inhaler Inhale 2 puffs every 4 hours as needed for wheezing. 18 g 5     ergocalciferol (ERGOCALCIFEROL) 50,000 unit capsule Take 1 capsules by mouth every 2 weeks 6 capsule 0     fluticasone (FLOVENT HFA) 110 mcg/actuation inhaler Inhale 2 puffs 2 (two) times a day. 1 Inhaler 11     hydrOXYzine (ATARAX) 25 MG tablet Take 1 tablet (25 mg total) by mouth 3 (three) times a day as needed (nausea associated with anxiety). 30 tablet 5     inhalational spacing device Spcr Use As Directed. With Inhaler       loratadine (CLARITIN) 10 mg tablet Take 1 tablet (10 mg total) by mouth  daily.       MAGNESIUM GLUCONATE ORAL Take 1 tablet by mouth daily.       montelukast (SINGULAIR) 10 mg tablet Take 1 tablet (10 mg total) by mouth daily. 30 tablet 7     multivitamin (MULTIVITAMIN) per tablet Take 1 tablet by mouth 2 (two) times a day.       omega-3 fatty acids-vitamin E (FISH OIL) 1,000 mg cap Take 1 capsule by mouth daily. 90 each 3     predniSONE (DELTASONE) 20 MG tablet Take 40 mg by mouth daily.       triamcinolone (KENALOG) 0.5 % ointment Apply to hands at bedtime PRN 30 g 2     UBIDECARENONE (COQ-10 ORAL) Take 1 tablet by mouth daily.       No current facility-administered medications on file prior to visit.        HEALTH MAINTENANCE / SCREENING   No Data Recorded, No Data Recorded,No Data Recorded  Immunization History   Administered Date(s) Administered     DTP 1981, 01/13/1982, 03/11/1982, 03/07/1984, 09/08/1987     Hep B, historic 05/19/1997, 09/11/1997, 12/17/1997     Influenza, inj, historic 12/03/1986, 09/08/1987, 11/08/1989, 11/07/1991, 11/03/1995, 12/17/1997, 10/02/1998     MMR 09/21/1983, 08/29/1994     OPV 1981, 01/13/1982, 03/07/1984, 09/08/1987     Td, historic 04/22/1994     Health Maintenance   Topic     ASTHMA CONTROL TEST      ASTHMA FOLLOW-UP      TDAP ADULT ONE TIME DOSE      TD 18+ HE      ADVANCE DIRECTIVES DISCUSSED WITH PATIENT      INFLUENZA VACCINE RULE BASED (1)       Paola Elam MD  Family Medicine, [facility]   This transcription uses voice recognition software, which may contain typographical errors.

## 2021-06-13 NOTE — PROGRESS NOTES
"Ilir Hidalgo is a 40 y.o. male who is being evaluated via a billable telephone visit.      The patient has been notified of following:     \"This telephone visit will be conducted via a call between you and your physician/provider. We have found that certain health care needs can be provided without the need for a physical exam.  This service lets us provide the care you need with a short phone conversation.  If a prescription is necessary we can send it directly to your pharmacy.  If lab work is needed we can place an order for that and you can then stop by our lab to have the test done at a later time.    Telephone visits are billed at different rates depending on your insurance coverage. During this emergency period, for some insurers they may be billed the same as an in-person visit.  Please reach out to your insurance provider with any questions.    If during the course of the call the physician/provider feels a telephone visit is not appropriate, you will not be charged for this service.\"    Patient has given verbal consent to a Telephone visit? Yes    What phone number would you like to be contacted at? [phone]     Patient would like to receive their AVS by AVS Preference: Leonard.    Additional provider notes: me     SUBJECTIVE: Ilir Hidalgo is a 40 y.o. male with:  Chief Complaint   Patient presents with     Follow-up    1) Vitamin D - Has had low vitamin D in the past.  He is currently not taking any vitamin D supplementation.    2) Asthma - He uses Flovent on a PRN basis, not daily.  Uses albuterol on a PRN basis.  He also has a neb machine at home. He does not feel his asthma is flared up at this time.    3) Anxiety - He has had more stress with the pandemic.  Frustrated as he cannot get his disability services.  He does counseling on a regular basis.  He does go for walks on a regular basis.  He is living with meals.  Eating more meals at home.  He is on unemployment.    4) Autism - He " is developmentally disabled.  Mother states I may need to fill out a letter for sliding scale insulin in future for him.    Assessment/Plan:  1. Vitamin D deficiency  Recheck level  - Vitamin D, Total (25-Hydroxy); Future    2. Anxiety Disorder NOS  Continue counseling.  He has declined medicine in the past.    3. Mild persistent asthma without complication  He has been stable.  Use inhalers as needed.        Phone call duration: 12  minutes    KATHY Balderas

## 2021-06-13 NOTE — PROGRESS NOTES
"SUBJECTIVE: Ilir Hidalgo is a 37 y.o. male with:  Chief Complaint   Patient presents with     Asthma     flare ups- might had pickup up \"[place] infection\" at [facility] - all the way labor day - green mucus     Thyroid Problem     thyroid glands    1) Asthma- He has had a flare-up in the last few days due to poor air quality.  Last night he used his albuterol every 1 hour.  He was coughing last night.  He feels shortness of breath today.  He is using Flovent inhaler 2 puffs more frequently.  He was at a hotel in [place] end of August and worried about the AC unit not providing clean air.  He is taking the Singulair.  No fever/ chills or recent illness.  He has had some sinus congestion.    2) Pt's mother is worried about his thyroid gland.  He has had normal thyroid function in the past.    3) Anxiety - He is continuing to see Balbina Wahl for therapy.  He has a new job and got upset at work and yelled so had some consequences.  He feels that his disability is not being addressed at work.  [place] also [place]    OBJECTIVE: /80 (Patient Site: Left Arm, Patient Position: Sitting, Cuff Size: Adult Large)  Pulse 64  Temp 98  F (36.7  C) (Oral)   Resp 12  Ht 5' 10.47\" (1.79 m)  Wt (!) 258 lb 4 oz (117.1 kg)  SpO2 95%  BMI 36.56 kg/m2   No acute distress.  HEENT: Head is atraumatic and/normocephalic.  PERRL.  Conjunctiva clear.  Tympanic membranes grey with normal landmarks and normal light reflexes.  No nasal discharge.  Oropharynx is pink and moist.  Sinuses nontender.  Neck: Supple.  No lymphadenopathy or thyromegaly.  Lungs:  Slight expiratory wheezing.  No retractions, or tachypnea.  Heart: RRR. S1 and S2 normal.  No murmurs, rubs, or gallops.  Neuro: Awake, alert, oriented x 3.  Normal strength and tone.  Normal gait.   PSYCH:  Awake, alert, and oriented x 3.  Mood and affect are anxious.  Good eye contact.  Speech is normal.  No suicidal ideation.  No " hallucinations.    Ilir was seen today for asthma and thyroid problem.    Diagnoses and all orders for this visit:    Asthma exacerbation- Will start him on course of steroids.    Anxiety Disorder NOS- Continue counseling.  Consider medication.    Other orders  -     predniSONE (DELTASONE) 20 MG tablet; Take 2 tablets (40 mg total) by mouth daily.       Patient Instructions   Take prednisone 20 mg 2 tabs daily for 5 days    Resume Flovent 2 puffs twice daily    Make a follow-up visit if not improved by Monday ( call [facility] )    Consider Gene Sight testing for medication interactions with genes    Lisa Galeano

## 2021-06-15 NOTE — TELEPHONE ENCOUNTER
Spoke with pt and mom today. Informed them that pt is due for his physical and he will plan to est. care and do physical with Dr. Padilla at [facility]. Will send [facility] phone number and address to pt via my chart per his request and they will schedule the appointment when they are ready. xl

## 2021-06-15 NOTE — TELEPHONE ENCOUNTER
Refill Approved    Rx renewed per Medication Renewal Policy. Medication was last renewed on 11/22/20.    Anthony Santos, [facility] Triage/Med Refill 2/10/2021     Requested Prescriptions   Pending Prescriptions Disp Refills     albuterol (VENTOLIN HFA) 90 mcg/actuation inhaler [Pharmacy Med Name: VENTOLIN HFA INH W/DOS CTR 200PUFFS] 54 g 0     Sig: INHALE 2 PUFFS BY MOUTH EVERY 4 HOURS AS NEEDED FOR WHEEZING       Albuterol/Levalbuterol Refill Protocol Passed - 2/10/2021  8:12 AM        Passed - PCP or prescribing provider visit in last year     Last office visit with prescriber/PCP: 1/13/2020 Lsia Galeano MD OR same dept: Visit date not found OR same specialty: 1/13/2020 Lisa Galeano MD Last physical: 5/9/2019       Next appt within 3 mo: Visit date not found  Next physical within 3 mo: Visit date not found  Prescriber OR PCP: Lisa Galeano MD  Last diagnosis associated with med order: 1. Mild persistent asthma with acute exacerbation  - VENTOLIN HFA 90 mcg/actuation inhaler [Pharmacy Med Name: VENTOLIN HFA INH W/DOS CTR 200PUFFS]; INHALE 2 PUFFS BY MOUTH EVERY 4 HOURS AS NEEDED FOR WHEEZING  Dispense: 54 g; Refill: 0    If protocol passes may refill for 6 months if within 3 months of last provider visit (or a total of 9 months). If patient requesting >1 inhaler per month refill x 6 months and have patient make appointment with provider.

## 2021-06-16 PROBLEM — J45.30 MILD PERSISTENT ASTHMA WITHOUT COMPLICATION: Status: ACTIVE | Noted: 2020-12-02

## 2021-06-16 NOTE — PROGRESS NOTES
Subjective:      Patient ID: Ilir Hidalgo is a 37 y.o. male.    Chief Complaint:    HPI Ilir Hidalgo is a 37 y.o. male with past medical history of autism, asthma, allergies, and cerumen impactions who presents today complaining of left ear fullness x 2 days.  Patient states that he is also had green nasal mucus secretions and congestion.  He denies any coughing.  He has had some green diarrhea that he has attributed to swallowing mucus.  He denies any nausea or vomiting.  He has not had any known fevers but had a few chills approximately 3 days ago.  He states that the left ear fullness is making it difficult for him to hear.        Past Medical History:   Diagnosis Date     Allergic Rhinitis     Created by Conversion      Anxiety Disorder NOS     Created by Conversion      Asthma With Acute Exacerbation     Created by Conversion      Autistic Disorder     Created by Conversion      Caries     Created by Conversion      Eczema     Created by Conversion      Insomnia     Created by Conversion      Learning Disability - Language     Created by Conversion      Mild Persistent Asthma     Created by Conversion      Nasal Polyps     Created by Conversion      Obesity     Created by Conversion      Tooth Eruption Disturbances     Created by Conversion      Vitamin D Deficiency     Created by Conversion          Social History   Substance Use Topics     Smoking status: Never Smoker     Smokeless tobacco: Never Used     Alcohol use Yes       Review of Systems   Constitutional: Positive for chills.   HENT: Positive for congestion, ear pain (bilateral ear fullness) and rhinorrhea.    Respiratory: Negative for cough.    Gastrointestinal: Positive for diarrhea. Negative for nausea and vomiting.   Allergic/Immunologic: Positive for environmental allergies.       Objective:     /70 (Patient Site: Right Arm, Patient Position: Sitting, Cuff Size: Adult Large)  Pulse 80  Temp 98.2  F (36.8  C) (Oral)   Resp 18   Wt (!) 252 lb 9.6 oz (114.6 kg)  SpO2 95%  BMI 35.76 kg/m2    Physical Exam   Constitutional: He appears well-developed and well-nourished. No distress.   HENT:   Head: Normocephalic and atraumatic.   Right Ear: Tympanic membrane, external ear and ear canal normal.   Left Ear: Tympanic membrane and external ear normal. No drainage.   Patient has 100% blockage of cerumen in the left canal.  Cerumen is very dark.  After irrigation performed by to completed the medical assistant the ear canal was patent and there was some inflammation behind when the ear cerumen impaction was.  The tympanic membrane appears normal and has no bulging, erythema, or effusion.   Eyes: Conjunctivae are normal.   Cardiovascular: Normal rate, regular rhythm and normal heart sounds.  Exam reveals no gallop and no friction rub.    No murmur heard.  Pulmonary/Chest: Effort normal and breath sounds normal. No respiratory distress. He has no wheezes. He has no rales.   Skin: He is not diaphoretic.   Psychiatric: He has a normal mood and affect. His behavior is normal. Judgment and thought content normal.   Nursing note and vitals reviewed.      Assessment:     Procedures    1. Hearing loss due to cerumen impaction, left  Nursing communication   2. Left otitis externa  ofloxacin (FLOXIN) 0.3 % otic solution         Patient Instructions   1. Apply ofloxacin according to bottle instructions.  2. After you have completed the treatment he can use debrox solution and bulb suction to try to remove your ear wax.   3. Follow up if you are not having any improvement in your sinus symptoms or ear symptoms over the course of the next 7 days.
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
## 2021-06-21 NOTE — LETTER
Letter by Lisa Galeano MD at      Author: Lisa Galeano MD Service: -- Author Type: --    Filed:  Encounter Date: 12/2/2020 Status: (Other)                    My Depression Action Plan  Name: Ilir Hidalgo   Date of Birth 1980  Date: 12/2/2020    My Doctor: Lisa Galeano MD   My Clinic: [address] [address]  [phone]          GREEN    ZONE   Good Control    What it looks like:     Things are going generally well. You have normal ups and downs. You may even feel depressed from time to time, but bad moods usually last less than a day.   What you need to do:  1. Continue to care for yourself (see self care plan)  2. Check your depression survival kit and update it as needed  3. Follow your physicians recommendations including any medication.  4. Do not stop taking medication unless you consult with your physician first.           YELLOW         ZONE Getting Worse    What it looks like:     Depression is starting to interfere with your life.     It may be hard to get out of bed; you may be starting to isolate yourself from others.    Symptoms of depression are starting to last most all day and this has happened for several days.     You may have suicidal thoughts but they are not constant.   What you need to do:     1. Call your care team. Your response to treatment will improve if you keep your care team informed of your progress. Yellow periods are signs an adjustment may need to be made.     2. Continue your self-care.  Just get dressed and ready for the day.  Don't give yourself time to talk yourself out of it.    3. Talk to someone in your support network.    4. Open up your depression Depression Self-Care Plan / Wellness kit.           RED    ZONE Medical Alert - Get Help    What it looks like:     Depression is seriously interfering with your life.     You may experience these or other symptoms:  You cant get out of bed most days, cant work or engage in other necessary activities, you have trouble taking care of basic hygiene, or basic responsibilities, thoughts of suicide or death that will not go away, self-injurious behavior.     What you need to do:  1. Call your care team and request a same-day appointment. If they are not available (weekends or after hours) call your local crisis line, emergency room or 911.            Self-Care Plan / Wellness Kit    Self-Care for Depression  Heres the deal. Your body and mind are really not as separate as most people think.  What you do and think affects how you feel and how you feel influences what you do and think. This means if you do things that people who feel good do, it will help you feel better.  Sometimes this is all it takes.  There is also a place for medication and therapy depending on how severe your depression is, so be sure to consult with your medical provider and/ or Behavioral Health Consultant if your symptoms are worsening or not improving.     In order to better manage my stress, I will:    Exercise  Get some form of exercise, every day. This will help reduce pain and release endorphins, the feel good chemicals in your brain. This is almost as good as taking antidepressants!  This is not the same as joining a gym and then never going! (they count on that by the way?) It can be as simple as just going for a walk or doing some gardening, anything that will get you moving.      Hygiene   Maintain good hygiene (get out of bed in the morning, make your bed, brush your teeth, take a shower, and get dressed like you were going to work, even if you are unemployed).  If your clothes don't fit try to get ones that do.    Diet  Strive to eat foods that are good for me, drink plenty of water, and avoid excessive sugar, caffeine, alcohol, and other mood-altering substances.  Some foods that are helpful in depression are: complex carbohydrates, B vitamins,  flaxseed, fish or fish oil, fresh fruits and vegetables.    Psychotherapy  Agree to participate in Individual Therapy (if recommended).    Medication  If prescribed medications, I agree to take them.  Missing doses can result in serious side effects.  I understand that drinking alcohol, or other illicit drug use, may cause potential side effects.  I will not stop my medication abruptly without first discussing it with my provider.    Staying Connected With Others  Stay in touch with my friends, family members, and my primary care provider/team.    Use your imagination  Be creative.  We all have a creative side; it doesnt matter if its oil painting, sand castles, or mud pies! This will also kick up the endorphins.    Witness Beauty  (AKA stop and smell the roses) Take a look outside, even in mid-winter. Notice colors, textures. Watch the squirrels and birds.     Service to others  Be of service to others.  There is always someone else in need.  By helping others we can get out of ourselves and remember the really important things.  This also provides opportunities for practicing all the other parts of the program.    Humor  Laugh and be silly!  Adjust your TV habits for less news and crime-drama and more comedy.    Control your stress  Try breathing deep, massage therapy, biofeedback, and meditation. Find time to relax each day.     Crisis Text Line  http://www.crisistextline.org    The Crisis Text Line serves anyone, in any type of crisis, providing access to free, 24/7 support and information via the medium people already use and trust:    Here's how it works:  1.  Text 092-403 from anywhere in the [place], anytime, about any type of crisis.  2.  A live, trained Crisis Counselor receives the text and responds quickly.  3.  The volunteer Crisis Counselor will help you move from a 'hot moment to a cool moment'.  My support system    Clinic Contact:  Phone number:    Contact 1:  Phone number:    Contact 2:  Phone number:     Rastafari/:  Phone number:    Therapist:  Phone number:    [facility] crisis center:    Phone number:    Other community support:  Phone number:

## 2021-06-21 NOTE — LETTER
Letter by Lisa Galeano MD at      Author: Lisa Galeano MD Service: -- Author Type: --    Filed:  Encounter Date: 12/2/2020 Status: (Other)       My Asthma Action Plan    Name: Ilir Hidalgo   YOB: 1980  Date: 12/2/2020   My doctor: Lisa Galeano MD   My clinic: [facility]        My Control Medicine: Fluticasone propionate (Flovent HFA) - 220 mcg 2 puffs bid as needed  My Rescue Medicine: Albuterol (Proair/Ventolin/Proventil HFA) 2-4 puffs EVERY 4 HOURS as needed. Use a spacer if recommended by your provider.   My Oral Steroid Medicine: not needed My Asthma Severity:   Mild Persistent  Know your asthma triggers: smoke, upper respiratory infections, dust mites, pollens, mold, humidity, exercise or sports, emotions, cold air and Gastric Reflux               GREEN ZONE   Good Control    I feel good    No cough or wheeze    Can work, sleep and play without asthma symptoms     Take your asthma control medicine every day.     1. If exercise triggers your asthma, take your rescue medication    15 minutes before exercise or sports, and    During exercise if you have asthma symptoms  2. Spacer to use with inhaler: If you have a spacer, make sure to use it with your inhaler             YELLOW ZONE Getting Worse  I have ANY of these:    I do not feel good    Cough or wheeze    Chest feels tight    Wake up at night 1. Keep taking your Green Zone medications  2. Start taking your rescue medicine:    every 20 minutes for up to 1 hour. Then every 4 hours for 24-48 hours.  3. If you stay in the Yellow Zone for more than 12-24 hours, contact your doctor.  4. If you do not return to the Green Zone in 12-24 hours or you get worse, start taking your oral steroid medicine if prescribed by your provider.           RED ZONE Medical Alert - Get Help  I have ANY of these:    I feel awful    Medicine is not helping    Breathing getting  harder    Trouble walking or talking    Nose opens wide to breathe     1. Take your rescue medicine NOW  2. If your provider has prescribed an oral steroid medicine, start taking it NOW  3. Call your doctor NOW  4. If you are still in the Red Zone after 20 minutes and you have not reached your doctor:    Take your rescue medicine again and    Call 911 or go to the emergency room right away    See your regular doctor within 2 weeks of an Emergency Room or Urgent Care visit for follow-up treatment.          Annual Reminders:  Meet with Asthma Educator,  Flu Shot in the Fall, consider Pneumonia Vaccination for patients with asthma (aged 19 and older).    Pharmacy:   [facility] - [place], [place] - [address]  [address]  [place] [address]  Phone: [phone] Fax: [phone]    [id number] - [place], [place] - [address] AT [facility] [address]  [address]  [place] [address]  Phone: [phone] Fax: [phone]      Electronically signed by Lisa Galeano MD   Date: 12/02/20                    Asthma Triggers  How To Control Things That Make Your Asthma Worse    Triggers are things that make your asthma worse.  Look at the list below to help you find your triggers and what you can do about them.  You can help prevent asthma flare-ups by staying away from your triggers.      Trigger                                                          What you can do   Cigarette Smoke  Tobacco smoke can make asthma worse. Do not allow smoking in your home, car or around you.  Be sure no one smokes at a james day care or school.  If you smoke, ask your health care provider for ways to help you quit.  Ask family members to quit too.  Ask your health care provider for a referral to Quit Plan to help you quit smoking, or call 9-705-904-PLAN.     Colds, Flu, Bronchitis  These are common triggers of asthma. Wash your hands often.  Dont touch  your eyes, nose or mouth.  Get a flu shot every year.     Dust Mites  These are tiny bugs that live in cloth or carpet. They are too small to see. Wash sheets and blankets in hot water every week.   Encase pillows and mattress in dust mite proof covers.  Avoid having carpet if you can. If you have carpet, vacuum weekly.   Use a dust mask and HEPA vacuum.   Pollen and Outdoor Mold  Some people are allergic to trees, grass, or weed pollen, or molds. Try to keep your windows closed.  Limit time out doors when pollen count is high.   Ask you health care provider about taking medicine during allergy season.     Animal Dander  Some people are allergic to skin flakes, urine or saliva from pets with fur or feathers. Keep pets with fur or feathers out of your home.    If you cant keep the pet outdoors, then keep the pet out of your bedroom.  Keep the bedroom door closed.  Keep pets off cloth furniture and away from stuffed toys.     Mice, Rats, and Cockroaches   Some people are allergic to the waste from these pests.   Cover food and garbage.  Clean up spills and food crumbs.  Store grease in the refrigerator.   Keep food out of the bedroom.   Indoor Mold  This can be a trigger if your home has high moisture. Fix leaking faucets, pipes, or other sources of water.   Clean moldy surfaces.  Dehumidify basement if it is damp and smelly.   Smoke, Strong Odors, and Sprays  These can reduce air quality. Stay away from strong odors and sprays, such as perfume, powder, hair spray, paints, smoke incense, paint, cleaning products, candles and new carpet.   Exercise or Sports  Some people with asthma have this trigger. Be active!  Ask your doctor about taking medicine before sports or exercise to prevent symptoms.    Warm up for 5-10 minutes before and after sports or exercise.     Other Triggers of Asthma  Cold air:  Cover your nose and mouth with a scarf.  Sometimes laughing or crying can be a trigger.  Some medicines and food can  trigger asthma.

## 2021-06-26 NOTE — PROGRESS NOTES
Progress Notes by Ilir Mcpherson PA-C at 6/11/2018  1:00 PM     Author: Ilir Mcpherson PA-C Service: -- Author Type: Physician Assistant    Filed: 6/11/2018  1:38 PM Encounter Date: 6/11/2018 Status: Signed    : Ilir Mcpherson PA-C (Physician Assistant)          Assessment and Plan     Ilir was seen today for insect bite.    Diagnoses and all orders for this visit:    Skin lesion of left arm       HPI     Chief Complaint   Patient presents with   ? Insect Bite     left upper arm, x2-4 days     Ilir Hidalgo is a 37 y.o. male seen today for a possible bite on his left upper arm that he first noticed 2-3 days ago.  He is not able to recall a specific bug bite.  Denies pulling any ticks off his skin.  Denies spending time in any tick endemic areas.  His only exposure was mowing the lawn once a few days ago.  Spends most of his time indoors.  He denies any pain or tenderness in the area of the discoloration.  Denies fevers or chills.  Denies nausea, vomiting, diarrhea.  Denies myalgias or arthralgias.       Current Outpatient Prescriptions:   ?  albuterol (VENTOLIN HFA) 90 mcg/actuation inhaler, Inhale 2 puffs every 4 hours as needed for wheezing., Disp: 18 g, Rfl: 5  ?  ergocalciferol (ERGOCALCIFEROL) 50,000 unit capsule, Take 1 capsules by mouth every 2 weeks, Disp: 6 capsule, Rfl: 0  ?  fluticasone (FLOVENT HFA) 110 mcg/actuation inhaler, Inhale 2 puffs 2 (two) times a day., Disp: 1 Inhaler, Rfl: 5  ?  hydrOXYzine (ATARAX) 25 MG tablet, Take 1 tablet (25 mg total) by mouth 3 (three) times a day as needed (nausea associated with anxiety)., Disp: 30 tablet, Rfl: 5  ?  inhalational spacing device Spcr, Use As Directed. With Inhaler, Disp: , Rfl:   ?  loratadine (CLARITIN) 10 mg tablet, Take 1 tablet (10 mg total) by mouth daily., Disp: , Rfl:   ?  MAGNESIUM GLUCONATE ORAL, Take 1 tablet by mouth daily., Disp: , Rfl:   ?  montelukast (SINGULAIR) 10 mg tablet, Take 1 tablet (10 mg total) by  mouth daily., Disp: 30 tablet, Rfl: 7  ?  multivitamin (MULTIVITAMIN) per tablet, Take 1 tablet by mouth 2 (two) times a day., Disp: , Rfl:   ?  omega-3 fatty acids-vitamin E (FISH OIL) 1,000 mg cap, Take 1 capsule by mouth daily., Disp: 90 each, Rfl: 3  ?  predniSONE (DELTASONE) 20 MG tablet, Take 2 tablets (40 mg total) by mouth daily., Disp: 10 tablet, Rfl: 0  ?  triamcinolone (KENALOG) 0.5 % ointment, Apply to hands at bedtime PRN, Disp: 30 g, Rfl: 2  ?  UBIDECARENONE (COQ-10 ORAL), Take 1 tablet by mouth daily., Disp: , Rfl:      Reviewed and updated: medical history, medications and allergies.     Review of Systems     General: Denies fever, chills, fatigue.  Cardiovascular: Denies chest pain, dyspnea on exertion, palpitations.  Respiratory: Denies dyspnea, cough, wheezing.  GI: Denies nausea, vomiting, diarrhea, constipation.  : Denies dysuria, polyuria.     Objective     Vitals:    06/11/18 1307   BP: 112/88   Patient Site: Right Arm   Patient Position: Sitting   Cuff Size: Adult Regular   Pulse: 89   Resp: 18   Temp: 98.4  F (36.9  C)   TempSrc: Oral   SpO2: 96%   Weight: (!) 252 lb 9.6 oz (114.6 kg)        Reviewed vital signs.  General: Appears calm, comfortable. Answers questions quickly and appropriately with clear speech. No apparent distress.  Skin: Pink, warm, dry.  There is an area of discoloration on the inner surface of the left upper arm approximately 0.5 cm x 3 cm.  It is flat and nontender.  Nonblanching.  HENT: Normocephalic, atraumatic.  Neck: Supple.  Cardiovascular: Strong, regular radial pulse.  Respiratory: Normal respiratory effort.  Neuro: Memory and cognition appear normal. Normal gait.  Psych: Mood and affect appear normal.          Imaging:   No results found.    Labs:  No results found for this or any previous visit (from the past 24 hour(s)).     Medical Decision-Making     Ilir is a well-appearing 37-year-old male presents with 2-3 days of a discolored area on the inside of  his left upper arm.  His appearance is consistent with a bruise.  There were no punctate lesions concerning for an insect bite.  It is not erythematous.  His expressed concern was a tick bite with resulting erythema migrans.  This lesion does not have the characteristic color, shape, or necessary tick exposure for erythema migrans.  He has no history, symptoms, or exam findings concerning for tickborne illness.  I do believe this is an isolated bruise and does not believe any other workup or investigation is indicated at this time.    Reviewed red flags that would trigger a prompt return to the clinic as noted below under patient instructions.  He expressed understanding of these directions and is in agreement with the plan.     Patient Instructions     Patient Instructions   You have a discolored area on your left upper arm.  Its appearance is not concerning for any sort of tick bite, insect bite, or rash.    Please return to the clinic if you notice any of the following:    Fever / chills.    Growing area of discoloration.    Pain in the discolored area.    Muscle aches or joint aches.        Discussed benefit vs risk of medications, dosing, side effects.  Patient was able to verbalize understanding.  After visit summary was provided for patient.     Gee Mcpherson PA-C

## 2021-07-01 ENCOUNTER — COMMUNICATION - HEALTHEAST (OUTPATIENT)
Dept: FAMILY MEDICINE | Facility: CLINIC | Age: 41
End: 2021-07-01

## 2021-07-01 DIAGNOSIS — J45.31 MILD PERSISTENT ASTHMA WITH ACUTE EXACERBATION: ICD-10-CM

## 2021-07-04 NOTE — TELEPHONE ENCOUNTER
Telephone Encounter by Martha Dial RN at 7/1/2021  7:04 PM     Author: Martha Dial RN Service: -- Author Type: Registered Nurse    Filed: 7/1/2021  7:05 PM Encounter Date: 7/1/2021 Status: Signed    : Martha Dial RN (Registered Nurse)       Refill Approved    Rx renewed per Medication Renewal Policy. Medication was last renewed on 2/10/21, last OV 12/2/20.    Martha Dial, [facility] Triage/Med Refill 7/1/2021     Requested Prescriptions   Pending Prescriptions Disp Refills   ? albuterol (PROAIR HFA;PROVENTIL HFA;VENTOLIN HFA) 90 mcg/actuation inhaler [Pharmacy Med Name: ALBUTEROL HFA INH(200 PUFFS)18GM] 54 g 1     Sig: INHALE 2 PUFFS BY MOUTH EVERY 4 HOURS AS NEEDED FOR WHEEZING       Albuterol/Levalbuterol Refill Protocol Passed - 7/1/2021  6:49 PM        Passed - PCP or prescribing provider visit in last year     Last office visit with prescriber/PCP: 1/13/2020 Lisa Galeano MD OR same dept: Visit date not found OR same specialty: 1/13/2020 Lisa Galeano MD Last physical: 5/9/2019       Next appt within 3 mo: Visit date not found  Next physical within 3 mo: Visit date not found  Prescriber OR PCP: Lisa Galeano MD  Last diagnosis associated with med order: 1. Mild persistent asthma with acute exacerbation  - albuterol (PROAIR HFA;PROVENTIL HFA;VENTOLIN HFA) 90 mcg/actuation inhaler [Pharmacy Med Name: ALBUTEROL HFA INH(200 PUFFS)18GM]; INHALE 2 PUFFS BY MOUTH EVERY 4 HOURS AS NEEDED FOR WHEEZING  Dispense: 54 g; Refill: 1    If protocol passes may refill for 6 months if within 3 months of last provider visit (or a total of 9 months). If patient requesting >1 inhaler per month refill x 6 months and have patient make appointment with provider.

## 2021-08-06 NOTE — PATIENT INSTRUCTIONS - HE
Patient Instructions by Lisa Galeano MD at 5/9/2019  1:20 PM     Author: Lisa Galeano MD Service: -- Author Type: Physician    Filed: 5/9/2019  1:56 PM Encounter Date: 5/9/2019 Status: Addendum    : Lisa Galeano MD (Physician)    Related Notes: Original Note by Lisa Galeano MD (Physician) filed at 5/9/2019  1:42 PM         Patient Education   Your Health Risk Assessment indicates you feel you are not in good emotional health.    Recreation   Recreation is not limited to sports and team events. It includes any activity that provides relaxation, interest, enjoyment, and exercise. Recreation provides an outlet for physical, mental, and social energy. It can give a sense of worth and achievement. It can help you stay healthy.    Mental Exercise and Social Involvement  Mental and emotional health is as important as physical health. Keep in touch with friends and family. Stay as active as possible. Continue to learn and challenge yourself.   Things you can do to stay mentally active are:    Learn something new, like a foreign language or musical instrument.     Play SCRABBLE or do crossword puzzles. If you cannot find people to play these games with you at home, you can play them with others on your computer through the Internet.     Join a games club--anything from card games to chess or checkers or lawn bowling.     Start a new hobby.     Go back to school.     Volunteer.     Read.     Keep up with world events.       Patient Education   Depression and Suicide in Older Adults  Nearly 2 million older Americans have some type of depression. Sadly, some of them even take their own lives. Yet depression among older adults is often ignored. Learn the warning signs. You may help spare a loved one needless pain. You may also save a life.       What Is Depression?  Depression is a mood disorder that affects the way you think and feel. The most common symptom is a  feeling of deep sadness. People who are depressed also may seem tired and listless. And nothing seems to give them pleasure. Its normal to grieve or be sad sometimes. But sadness lessens or passes with time. Depression rarely goes away or improves on its own. Other symptoms of depression are:    Sleeping more or less than normal    Eating more or less than normal    Having headaches, stomachaches, or other pains that dont go away    Feeling nervous, empty, or worthless    Crying a great deal    Thinking or talking about suicide or death    Feeling confused or forgetful  What Causes It?  The causes of depression arent fully known. Certain chemicals in the brain play a role. Depression does run in families. And life stresses can also trigger depression in some people. That may be the case with older adults. They often face great burdens, such as the death of friends or a spouse. They may have failing health. And they are more likely to be alone, lonely, or poor.  How You Can Help  Often, depressed people may not want to ask for help. When they do, they may be ignored. Or, they may receive the wrong treatment. You can help by showing parents and older friends love and support. If they seem depressed, help them find the right treatment. Talk to your doctor. Or contact a local mental health center, social service agency, or hospital. With modern treatment, no one has to suffer from depression.  Resources:    National [place] of Mental Health  397.579.7857  www.nimh.nih.gov    National [place] on Mental Illness  891.765.1525  www.[place].org    [facility] [place]  399.590.1127  www.[facility].org    National Suicide Hotline  389.861.2392 (800-SUICIDE)      2514-9525 [employer]. [address] [place] [address]. All rights reserved. This information is not intended as a substitute for professional medical care. Always follow your healthcare professional's instructions.         Patient Education    Understanding Advance Care Planning  Advance care planning is the process of deciding ones own future medical care. It helps ensure that if you cant speak for yourself, your wishes can still be carried out. The plan is a series of legal documents that note a persons wishes. The documents vary by state. Advance care planning may be done when a person has a serious illness that is expected to get worse. It may be done before major surgery. And it can help you and your family be prepared in case of a major illness or injury. Advance care planning helps with making decisions at these times.       A health care proxy is a person who acts as the voice of a patient when the patient cant speak for himself or herself. The name of this role varies by state. It may be called a Durable Medical Power of  or Durable Power of  for Healthcare. It may be called an agent, surrogate, or advocate. Or it may be called a representative or decision maker. It is an official duty that is identified by a legal document. The document also varies by state.    Why Is Advance Care Planning Important?  If a person communicates their healthcare wishes:    They will be given medical care that matches their values and goals.    Their family members will not be forced to make decisions in a crisis with no guidance.  Creating a Plan  Making an advance care plan is often done in 3 steps:    Thinking about ones wishes. To create an advance care plan, you should think about what kind of medical treatment you would want if you lose the ability to communicate. Are there any situations in which you would refuse or stop treatment? Are there therapies you would want or not want? And whom do you want to make decisions for you? There are many places to learn more about how to plan for your care. Ask your doctor or  for resources.    Picking a health care proxy. This means choosing a trusted person to speak for you only when you  cant speak for yourself. When you cannot make medical decisions, your proxy makes sure the instructions in your advance care plan are followed. A proxy does not make decisions based on his or her own opinions. They must put aside those opinions and values if needed, and carry out your wishes.    Filling out the legal documents. There are several kinds of legal documents for advance care planning. Each one tells health care providers your wishes. The documents may vary by state. They must be signed and may need to be witnessed or notarized. You can cancel or change them whenever you wish. Depending on your state, the documents may include a Healthcare Proxy form, Living Will, Durable Medical Power of , Advance Directive, or others.  The Familys Role  The best help a family can give is to support their loved ones wishes. Open and honest communication is vital. Family should express any concerns they have about the patients choices while the patient can still make decisions.    0284-0724 [employer]. [address] [place] [address]. All rights reserved. This information is not intended as a substitute for professional medical care. Always follow your healthcare professional's instructions.         Also, [facility] offers a free, downloadable health care directive that allows you to share your treatment choices and personal preferences if you cannot communicate your wishes. It also allows you to appoint another person (called a health care agent) to make health care decisions if you are unable to do so. You can download an advance directive by going here: [email or web address]     Patient Education   Personalized Prevention Plan  You are due for the preventive services outlined below.  Your care team is available to assist you in scheduling these services.  If you have already completed any of these items, please share that information with your  care team to update in your medical record.  Health Maintenance   Topic Date Due   ? DEPRESSION FOLLOW UP  1981   ? TDAP ADULT ONE TIME DOSE  09/06/1999   ? ADVANCE DIRECTIVES DISCUSSED WITH PATIENT  03/31/2016   ? INFLUENZA VACCINE RULE BASED (Season Ended) 08/01/2019   ? ASTHMA CONTROL TEST  09/20/2019   ? ASTHMA FOLLOW-UP  09/20/2019   ? TD 18+ HE  Discontinued      L-theanine for anxiety

## 2021-10-02 ENCOUNTER — HEALTH MAINTENANCE LETTER (OUTPATIENT)
Age: 41
End: 2021-10-02

## 2021-11-02 DIAGNOSIS — J45.30 MILD PERSISTENT ASTHMA WITHOUT COMPLICATION: Primary | ICD-10-CM

## 2021-11-04 RX ORDER — ALBUTEROL SULFATE 90 UG/1
AEROSOL, METERED RESPIRATORY (INHALATION)
Qty: 54 G | Refills: 0 | Status: SHIPPED | OUTPATIENT
Start: 2021-11-04 | End: 2022-01-05

## 2021-11-04 NOTE — TELEPHONE ENCOUNTER
"Disp Refills Start End RUSSEL    albuterol (PROAIR HFA;PROVENTIL HFA;VENTOLIN HFA) 90 mcg/actuation inhaler 54 g 2 7/1/2021  No   Sig: INHALE 2 PUFFS BY MOUTH EVERY 4 HOURS AS NEEDED FOR WHEEZING   Sent to pharmacy as: albuterol sulfate HFA 90 mcg/actuation aerosol inhaler (PROAIR HFA;PROVENTIL HFA;VENTOLIN HFA)   E-Prescribing Status: Receipt confirmed by pharmacy (7/1/2021  7:05 PM CDT)       albuterol (PROAIR HFA;PROVENTIL HFA;VENTOLIN HFA) 90 mcg/actuation inhaler [[id number]]    Electronically signed by: Martha Dial RN on 07/01/21 1905 Status: Active   Ordering user: Martha Dial RN 07/01/21 1905 Ordering provider: Lisa Galeano MD   Authorized by: Lisa Galeano MD   Frequency:  07/01/21 - Until Discontinued Released by: Martha Dial RN 07/01/21 1905   Diagnoses  Mild persistent asthma with acute exacerbation [J45.31]     Routing refill request to provider for review/approval because:  Patient needs to be seen because it has been more than 6 months since last office visit.  ACT Score      Last office visit provider:  12/2/20     Requested Prescriptions   Pending Prescriptions Disp Refills     albuterol (PROAIR HFA/PROVENTIL HFA/VENTOLIN HFA) 108 (90 Base) MCG/ACT inhaler [Pharmacy Med Name: ALBUTEROL HFA INH(200 PUFFS)18GM] 54 g      Sig: INHALE 2 PUFFS BY MOUTH EVERY 4 HOURS AS NEEDED FOR WHEEZING       Asthma Maintenance Inhalers - Anticholinergics Failed - 11/2/2021  5:52 PM        Failed - Asthma control assessment score within normal limits in last 6 months     Please review ACT score.           Failed - Medication is active on med list        Failed - Recent (6 mo) or future (30 days) visit within the authorizing provider's specialty     Patient had office visit in the last 6 months or has a visit in the next 30 days with authorizing provider or within the authorizing provider's specialty.  See \"Patient Info\" tab in inbasket, or \"Choose Columns\" in Meds & Orders " "section of the refill encounter.            Passed - Patient is age 12 years or older       Short-Acting Beta Agonist Inhalers Protocol  Failed - 11/2/2021  5:52 PM        Failed - Asthma control assessment score within normal limits in last 6 months     Please review ACT score.           Failed - Medication is active on med list        Failed - Recent (6 mo) or future (30 days) visit within the authorizing provider's specialty     Patient had office visit in the last 6 months or has a visit in the next 30 days with authorizing provider or within the authorizing provider's specialty.  See \"Patient Info\" tab in inbasket, or \"Choose Columns\" in Meds & Orders section of the refill encounter.            Passed - Patient is age 12 or older             Evelia Aquino RN 11/04/21 8:38 AM  "

## 2022-01-03 DIAGNOSIS — J45.30 MILD PERSISTENT ASTHMA WITHOUT COMPLICATION: ICD-10-CM

## 2022-01-05 DIAGNOSIS — J45.30 MILD PERSISTENT ASTHMA WITHOUT COMPLICATION: ICD-10-CM

## 2022-01-05 RX ORDER — ALBUTEROL SULFATE 90 UG/1
AEROSOL, METERED RESPIRATORY (INHALATION)
Qty: 54 G | Refills: 0 | Status: SHIPPED | OUTPATIENT
Start: 2022-01-05 | End: 2022-01-05

## 2022-01-05 RX ORDER — ALBUTEROL SULFATE 90 UG/1
AEROSOL, METERED RESPIRATORY (INHALATION)
Qty: 18 G | Refills: 0 | Status: SHIPPED | OUTPATIENT
Start: 2022-01-05

## 2022-01-05 NOTE — TELEPHONE ENCOUNTER
Pt has appt next week with new PCP at [facility].  Is out of inhaler please send rx as pt is out of meds.

## 2022-01-19 DIAGNOSIS — J45.30 MILD PERSISTENT ASTHMA WITHOUT COMPLICATION: ICD-10-CM

## 2022-01-21 RX ORDER — ALBUTEROL SULFATE 90 UG/1
AEROSOL, METERED RESPIRATORY (INHALATION)
Qty: 18 G | Refills: 0 | OUTPATIENT
Start: 2022-01-21

## 2022-01-21 NOTE — TELEPHONE ENCOUNTER
This was last filled less than 1 month ago.  Patient needs visit with me or another provider as asthma not well controlled.

## 2022-01-21 NOTE — TELEPHONE ENCOUNTER
"Routing refill request to provider for review/approval because:  ACT score out of date.  Patient needs to be seen because it has been more than 1 year since last office visit.  PCP information not populated in chart.    Last Written Prescription Date:  1/5/22  Last Fill Quantity: 18 g,  # refills: 0   Last office visit provider:  12/2/20     Requested Prescriptions   Pending Prescriptions Disp Refills     albuterol (PROAIR HFA/PROVENTIL HFA/VENTOLIN HFA) 108 (90 Base) MCG/ACT inhaler [Pharmacy Med Name: ALBUTEROL HFA INH(200 PUFFS)18GM] 18 g 0     Sig: INHALE 2 PUFFS BY MOUTH EVERY 4 HOURS AS NEEDED FOR WHEEZING       Asthma Maintenance Inhalers - Anticholinergics Failed - 1/19/2022  3:37 AM        Failed - Asthma control assessment score within normal limits in last 6 months     Please review ACT score.           Failed - Recent (6 mo) or future (30 days) visit within the authorizing provider's specialty     Patient had office visit in the last 6 months or has a visit in the next 30 days with authorizing provider or within the authorizing provider's specialty.  See \"Patient Info\" tab in inbasket, or \"Choose Columns\" in Meds & Orders section of the refill encounter.            Passed - Patient is age 12 years or older        Passed - Medication is active on med list       Short-Acting Beta Agonist Inhalers Protocol  Failed - 1/19/2022  3:37 AM        Failed - Asthma control assessment score within normal limits in last 6 months     Please review ACT score.           Failed - Recent (6 mo) or future (30 days) visit within the authorizing provider's specialty     Patient had office visit in the last 6 months or has a visit in the next 30 days with authorizing provider or within the authorizing provider's specialty.  See \"Patient Info\" tab in inbasket, or \"Choose Columns\" in Meds & Orders section of the refill encounter.            Passed - Patient is age 12 or older        Passed - Medication is active on med list    "          Anthony Santos RN 01/21/22 8:42 AM

## 2022-07-09 ENCOUNTER — HEALTH MAINTENANCE LETTER (OUTPATIENT)
Age: 42
End: 2022-07-09

## 2022-09-03 ENCOUNTER — HEALTH MAINTENANCE LETTER (OUTPATIENT)
Age: 42
End: 2022-09-03

## 2023-07-22 ENCOUNTER — HEALTH MAINTENANCE LETTER (OUTPATIENT)
Age: 43
End: 2023-07-22